# Patient Record
Sex: MALE | Race: ASIAN
[De-identification: names, ages, dates, MRNs, and addresses within clinical notes are randomized per-mention and may not be internally consistent; named-entity substitution may affect disease eponyms.]

---

## 2023-10-31 ENCOUNTER — APPOINTMENT (OUTPATIENT)
Dept: ORTHOPEDIC SURGERY | Facility: CLINIC | Age: 23
End: 2023-10-31
Payer: OTHER MISCELLANEOUS

## 2023-10-31 VITALS — WEIGHT: 167 LBS

## 2023-10-31 DIAGNOSIS — S63.502A UNSPECIFIED SPRAIN OF LEFT WRIST, INITIAL ENCOUNTER: ICD-10-CM

## 2023-10-31 DIAGNOSIS — S63.501A UNSPECIFIED SPRAIN OF RIGHT WRIST, INITIAL ENCOUNTER: ICD-10-CM

## 2023-10-31 DIAGNOSIS — Z78.9 OTHER SPECIFIED HEALTH STATUS: ICD-10-CM

## 2023-10-31 PROBLEM — Z00.00 ENCOUNTER FOR PREVENTIVE HEALTH EXAMINATION: Status: ACTIVE | Noted: 2023-10-31

## 2023-10-31 PROCEDURE — L3908: CPT | Mod: LT

## 2023-10-31 PROCEDURE — 73110 X-RAY EXAM OF WRIST: CPT | Mod: 50

## 2023-10-31 PROCEDURE — 99204 OFFICE O/P NEW MOD 45 MIN: CPT

## 2023-11-06 ENCOUNTER — APPOINTMENT (OUTPATIENT)
Dept: MRI IMAGING | Facility: CLINIC | Age: 23
End: 2023-11-06

## 2023-11-06 ENCOUNTER — APPOINTMENT (OUTPATIENT)
Dept: MRI IMAGING | Facility: CLINIC | Age: 23
End: 2023-11-06
Payer: OTHER MISCELLANEOUS

## 2023-11-06 PROCEDURE — 73221 MRI JOINT UPR EXTREM W/O DYE: CPT | Mod: LT

## 2023-11-14 ENCOUNTER — APPOINTMENT (OUTPATIENT)
Dept: ORTHOPEDIC SURGERY | Facility: CLINIC | Age: 23
End: 2023-11-14
Payer: OTHER MISCELLANEOUS

## 2023-11-14 DIAGNOSIS — S62.022A DISPLACED FRACTURE OF MIDDLE THIRD OF NAVICULAR [SCAPHOID] BONE OF LEFT WRIST, INITIAL ENCOUNTER FOR CLOSED FRACTURE: ICD-10-CM

## 2023-11-14 DIAGNOSIS — S62.115A NONDISPLACED FRACTURE OF TRIQUETRUM [CUNEIFORM] BONE, LEFT WRIST, INITIAL ENCOUNTER FOR CLOSED FRACTURE: ICD-10-CM

## 2023-11-14 PROCEDURE — 99214 OFFICE O/P EST MOD 30 MIN: CPT

## 2023-11-28 ENCOUNTER — APPOINTMENT (OUTPATIENT)
Dept: ORTHOPEDIC SURGERY | Facility: CLINIC | Age: 23
End: 2023-11-28
Payer: OTHER MISCELLANEOUS

## 2023-11-28 ENCOUNTER — RESULT CHARGE (OUTPATIENT)
Age: 23
End: 2023-11-28

## 2023-11-28 VITALS — WEIGHT: 167 LBS

## 2023-11-28 PROCEDURE — 99213 OFFICE O/P EST LOW 20 MIN: CPT

## 2023-11-28 PROCEDURE — 73110 X-RAY EXAM OF WRIST: CPT | Mod: LT

## 2023-12-19 ENCOUNTER — APPOINTMENT (OUTPATIENT)
Dept: ORTHOPEDIC SURGERY | Facility: CLINIC | Age: 23
End: 2023-12-19

## 2024-01-02 ENCOUNTER — APPOINTMENT (OUTPATIENT)
Dept: ORTHOPEDIC SURGERY | Facility: CLINIC | Age: 24
End: 2024-01-02
Payer: OTHER MISCELLANEOUS

## 2024-01-02 PROCEDURE — 73110 X-RAY EXAM OF WRIST: CPT | Mod: LT

## 2024-01-02 PROCEDURE — 99213 OFFICE O/P EST LOW 20 MIN: CPT

## 2024-01-02 NOTE — PHYSICAL EXAM
[] : tenderness over wrist [Distal Radius] : distal radius [Anatomic Snuff Box] : anatomic snuff box [Left] : left wrist [The fracture is in acceptable alignment. There is progression in healing seen] : The fracture is in acceptable alignment. There is progression in healing seen

## 2024-01-02 NOTE — HISTORY OF PRESENT ILLNESS
[Sudden] : sudden [Not working due to injury] : Work status: not working due to injury [de-identified] : 23 year old male followed for LEFT distal radius fracture with fractures of scaphoid and triqutrum.  HIs DOI is 10/18/23.  He is 2.5 months s./p injury [] : no [FreeTextEntry3] : 10/18/23 [FreeTextEntry5] : pain has improved since last office visit

## 2024-01-02 NOTE — DISCUSSION/SUMMARY
[de-identified] : Discussed the nature of the diagnosis and risk and benefits of different modalities of treatment. Repeat x-rays reviewed and discussed. May RTW, Ok for petrol in car. No heavy lifting with Left hand.  Continue with OT. New rx provided. RTO 4 weeks.

## 2024-01-30 ENCOUNTER — APPOINTMENT (OUTPATIENT)
Dept: ORTHOPEDIC SURGERY | Facility: CLINIC | Age: 24
End: 2024-01-30

## 2024-01-30 NOTE — HISTORY OF PRESENT ILLNESS
[Sudden] : sudden [Not working due to injury] : Work status: not working due to injury [] : yes [FreeTextEntry3] : 10/18/23 [FreeTextEntry5] : pain has improved since last office visit

## 2024-02-20 ENCOUNTER — APPOINTMENT (OUTPATIENT)
Dept: ORTHOPEDIC SURGERY | Facility: CLINIC | Age: 24
End: 2024-02-20
Payer: OTHER MISCELLANEOUS

## 2024-02-20 PROCEDURE — 99213 OFFICE O/P EST LOW 20 MIN: CPT

## 2024-02-20 NOTE — DISCUSSION/SUMMARY
[de-identified] : Discussed the nature of the diagnosis and risk and benefits of different modalities of treatment. As he is continuing to have pain in the RT wrist, will obtain an MRI to r/out SL injury.  He will continue with OT for the left wrist. New rx provided.  RTO after MRI.

## 2024-02-20 NOTE — HISTORY OF PRESENT ILLNESS
[Sudden] : sudden [Not working due to injury] : Work status: not working due to injury [de-identified] : 23 year old male followed for LEFT distal radius fracture with fractures of scaphoid and triquetrum. He has been attending OT. HE has switched his job. He states he is getting pain in both wrists. Followed for RT wrist sprain as well.   DOI is 10/18/23.  [] : no [FreeTextEntry3] : 10/18/23 [FreeTextEntry5] : pain has improved since last office visit

## 2024-02-20 NOTE — PHYSICAL EXAM
[Left] : left hand [Distal Radius] : distal radius [Anatomic Snuff Box] : anatomic snuff box [Right] : right hand [Dorsal Wrist] : dorsal wrist [] : positive Marrero's [FreeTextEntry9] : flexion 90 / exteneion 90  [de-identified] : No instability

## 2024-03-05 ENCOUNTER — APPOINTMENT (OUTPATIENT)
Dept: MRI IMAGING | Facility: CLINIC | Age: 24
End: 2024-03-05

## 2024-03-12 ENCOUNTER — APPOINTMENT (OUTPATIENT)
Dept: ORTHOPEDIC SURGERY | Facility: CLINIC | Age: 24
End: 2024-03-12
Payer: OTHER MISCELLANEOUS

## 2024-03-12 PROCEDURE — 99213 OFFICE O/P EST LOW 20 MIN: CPT

## 2024-03-12 RX ORDER — ALPRAZOLAM 0.25 MG/1
0.25 TABLET ORAL
Qty: 1 | Refills: 0 | Status: ACTIVE | COMMUNITY
Start: 2024-03-12 | End: 1900-01-01

## 2024-03-12 NOTE — DISCUSSION/SUMMARY
[de-identified] : Discussed the nature of the diagnosis and risk and benefits of different modalities of treatment. He will continue with OT for the LT wrist.  He had an MRI fo the right wrist today. Continue to work full duty.  RTO after MRI.

## 2024-03-12 NOTE — HISTORY OF PRESENT ILLNESS
[Sudden] : sudden [Not working due to injury] : Work status: not working due to injury [de-identified] : 23 year old male followed for LEFT distal radius fracture with fractures of scaphoid and triquetrum. Followed for RT wrist sprain as well. MRI was just approved. He has not yet had the MRI done.   DOI is 10/18/23. [] : no [FreeTextEntry3] : 10/18/23 [FreeTextEntry5] : no significant changes Yes...

## 2024-03-12 NOTE — PHYSICAL EXAM
[Left] : left hand [Distal Radius] : distal radius [Anatomic Snuff Box] : anatomic snuff box [Right] : right hand [Dorsal Wrist] : dorsal wrist [] : positive Marrero's [FreeTextEntry9] : flexion 90 / exteneion 90  [de-identified] : No instability

## 2024-03-13 ENCOUNTER — APPOINTMENT (OUTPATIENT)
Dept: MRI IMAGING | Facility: CLINIC | Age: 24
End: 2024-03-13

## 2024-03-14 ENCOUNTER — APPOINTMENT (OUTPATIENT)
Dept: MRI IMAGING | Facility: CLINIC | Age: 24
End: 2024-03-14
Payer: OTHER MISCELLANEOUS

## 2024-03-14 PROCEDURE — 73221 MRI JOINT UPR EXTREM W/O DYE: CPT | Mod: RT

## 2024-04-09 ENCOUNTER — APPOINTMENT (OUTPATIENT)
Dept: ORTHOPEDIC SURGERY | Facility: CLINIC | Age: 24
End: 2024-04-09
Payer: OTHER MISCELLANEOUS

## 2024-04-09 VITALS — WEIGHT: 167 LBS

## 2024-04-09 DIAGNOSIS — S63.501D UNSPECIFIED SPRAIN OF RIGHT WRIST, SUBSEQUENT ENCOUNTER: ICD-10-CM

## 2024-04-09 DIAGNOSIS — S52.502A UNSPECIFIED FRACTURE OF THE LOWER END OF LEFT RADIUS, INITIAL ENCOUNTER FOR CLOSED FRACTURE: ICD-10-CM

## 2024-04-09 DIAGNOSIS — S62.115D NONDISPLACED FRACTURE OF TRIQUETRUM [CUNEIFORM] BONE, LEFT WRIST, SUBSEQUENT ENCOUNTER FOR FRACTURE WITH ROUTINE HEALING: ICD-10-CM

## 2024-04-09 DIAGNOSIS — S62.022D DISPLACED FRACTURE OF MIDDLE THIRD OF NAVICULAR [SCAPHOID] BONE OF LEFT WRIST, SUBSEQUENT ENCOUNTER FOR FRACTURE WITH ROUTINE HEALING: ICD-10-CM

## 2024-04-09 PROCEDURE — 99213 OFFICE O/P EST LOW 20 MIN: CPT

## 2024-04-11 NOTE — PHYSICAL EXAM
[Bilateral] : hand bilaterally [] : tenderness over wrist [Anatomic Snuff Box] : anatomic snuff box [FreeTextEntry9] : RT flexion 110/ extension 95 LT flexion 105 / extension 100 [de-identified] : wrist flexion 5/5

## 2024-04-11 NOTE — DISCUSSION/SUMMARY
[de-identified] : Discussed the nature of the diagnosis and risk and benefits of different modalities of treatment. LT wrist improved. RT wrist MRI reviewed and discussed.  Discussed conservative management vs CSI for RT wrist. RT wrist symptoms tolerable at this time. He feels like he is continuing to improve.  RTO 6 weeks.

## 2024-04-11 NOTE — HISTORY OF PRESENT ILLNESS
[Sudden] : sudden [Not working due to injury] : Work status: not working due to injury [de-identified] : 23 year old male followed for LEFT distal radius fracture with fractures of scaphoid and triquetrum. LT wrist symptoms improved. Followed for RT wrist sprain as well. has returned after RT wrist MRI.   DOI is 10/18/23. [] : no [FreeTextEntry3] : 10/18/23 [FreeTextEntry5] : pain has improved

## 2024-04-11 NOTE — DATA REVIEWED
[MRI] : MRI [Right] : of the right [Wrist] : wrist [I independently reviewed and interpreted images and report] : I independently reviewed and interpreted images and report [FreeTextEntry1] : [facility] MRI RT wrist 3/14/24

## 2024-08-02 ENCOUNTER — APPOINTMENT (OUTPATIENT)
Dept: ORTHOPEDIC SURGERY | Facility: CLINIC | Age: 24
End: 2024-08-02
Payer: OTHER MISCELLANEOUS

## 2024-08-02 VITALS — BODY MASS INDEX: 25.76 KG/M2 | HEIGHT: 68 IN | WEIGHT: 170 LBS

## 2024-08-02 VITALS — BODY MASS INDEX: 25.14 KG/M2 | HEIGHT: 68.9 IN | WEIGHT: 169.76 LBS

## 2024-08-02 DIAGNOSIS — S62.115D NONDISPLACED FRACTURE OF TRIQUETRUM [CUNEIFORM] BONE, LEFT WRIST, SUBSEQUENT ENCOUNTER FOR FRACTURE WITH ROUTINE HEALING: ICD-10-CM

## 2024-08-02 DIAGNOSIS — S62.022D DISPLACED FRACTURE OF MIDDLE THIRD OF NAVICULAR [SCAPHOID] BONE OF LEFT WRIST, SUBSEQUENT ENCOUNTER FOR FRACTURE WITH ROUTINE HEALING: ICD-10-CM

## 2024-08-02 DIAGNOSIS — S63.501D UNSPECIFIED SPRAIN OF RIGHT WRIST, SUBSEQUENT ENCOUNTER: ICD-10-CM

## 2024-08-02 PROCEDURE — 99213 OFFICE O/P EST LOW 20 MIN: CPT

## 2024-08-02 NOTE — HISTORY OF PRESENT ILLNESS
[Sudden] : sudden [Not working due to injury] : Work status: not working due to injury [de-identified] : 23 year old male followed for LEFT distal radius fracture with fractures of scaphoid and triquetrum. Followed for RT wrist sprain as well. He reports pain and sensitivity to cold/heat when he uses his left hand/wrist for ADLs like cooking.   He is working full duty.  DOI is 10/18/23. [] : no [FreeTextEntry3] : 10/18/23 [FreeTextEntry5] : no significant changes

## 2024-08-02 NOTE — DISCUSSION/SUMMARY
[de-identified] : Discussed the nature of the diagnosis and risk and benefits of different modalities of treatment. Continue to work full duty.  RTO 6 weeks for re-evaluation.

## 2024-08-02 NOTE — PHYSICAL EXAM
[Left] : left hand [Distal Radius] : distal radius [Anatomic Snuff Box] : anatomic snuff box [Right] : right hand [Dorsal Wrist] : dorsal wrist [] : positive Marrero's [FreeTextEntry9] : 100 Flexion, 100 extension [de-identified] : No instability

## 2024-09-13 ENCOUNTER — APPOINTMENT (OUTPATIENT)
Dept: ORTHOPEDIC SURGERY | Facility: CLINIC | Age: 24
End: 2024-09-13
Payer: OTHER MISCELLANEOUS

## 2024-09-13 VITALS — WEIGHT: 170 LBS | BODY MASS INDEX: 25.76 KG/M2 | HEIGHT: 68 IN

## 2024-09-13 DIAGNOSIS — S62.022D DISPLACED FRACTURE OF MIDDLE THIRD OF NAVICULAR [SCAPHOID] BONE OF LEFT WRIST, SUBSEQUENT ENCOUNTER FOR FRACTURE WITH ROUTINE HEALING: ICD-10-CM

## 2024-09-13 DIAGNOSIS — S62.115D NONDISPLACED FRACTURE OF TRIQUETRUM [CUNEIFORM] BONE, LEFT WRIST, SUBSEQUENT ENCOUNTER FOR FRACTURE WITH ROUTINE HEALING: ICD-10-CM

## 2024-09-13 PROCEDURE — 73110 X-RAY EXAM OF WRIST: CPT | Mod: LT

## 2024-09-13 NOTE — HISTORY OF PRESENT ILLNESS
[de-identified] : 23 year old male followed for LEFT distal radius fracture with fractures of scaphoid and triquetrum. LT wrist symptoms improved. Followed for RT wrist sprain as well.     DOI is 10/18/23. stretcher

## 2024-09-13 NOTE — REASON FOR VISIT
[FreeTextEntry2] :  follow up: ELMO wrist DOI: 10/18/23 Reports that as of last week, his left wrist became more painful with no new injury.

## 2024-09-13 NOTE — DISCUSSION/SUMMARY
[de-identified] : Discussed the nature of the diagnosis and risk and benefits of different modalities of treatment. He is still with pain  x rays reviewed and discussed He will obtain secondary MRI, eval scaphoid for AVN as per radiology Rx provided RTO after MRI

## 2024-10-01 ENCOUNTER — APPOINTMENT (OUTPATIENT)
Dept: MRI IMAGING | Facility: CLINIC | Age: 24
End: 2024-10-01

## 2024-10-01 PROCEDURE — 73221 MRI JOINT UPR EXTREM W/O DYE: CPT | Mod: LT

## 2024-10-22 ENCOUNTER — APPOINTMENT (OUTPATIENT)
Dept: ORTHOPEDIC SURGERY | Facility: CLINIC | Age: 24
End: 2024-10-22
Payer: OTHER MISCELLANEOUS

## 2024-10-22 VITALS — BODY MASS INDEX: 25.76 KG/M2 | WEIGHT: 170 LBS | HEIGHT: 68 IN

## 2024-10-22 DIAGNOSIS — S62.022D DISPLACED FRACTURE OF MIDDLE THIRD OF NAVICULAR [SCAPHOID] BONE OF LEFT WRIST, SUBSEQUENT ENCOUNTER FOR FRACTURE WITH ROUTINE HEALING: ICD-10-CM

## 2024-10-22 DIAGNOSIS — S62.115D NONDISPLACED FRACTURE OF TRIQUETRUM [CUNEIFORM] BONE, LEFT WRIST, SUBSEQUENT ENCOUNTER FOR FRACTURE WITH ROUTINE HEALING: ICD-10-CM

## 2024-10-22 PROCEDURE — 99455 WORK RELATED DISABILITY EXAM: CPT
